# Patient Record
Sex: FEMALE | Race: WHITE | Employment: FULL TIME | URBAN - METROPOLITAN AREA
[De-identification: names, ages, dates, MRNs, and addresses within clinical notes are randomized per-mention and may not be internally consistent; named-entity substitution may affect disease eponyms.]

---

## 2020-02-17 ENCOUNTER — TRANSFERRED RECORDS (OUTPATIENT)
Dept: HEALTH INFORMATION MANAGEMENT | Facility: CLINIC | Age: 45
End: 2020-02-17

## 2020-03-18 ENCOUNTER — TELEPHONE (OUTPATIENT)
Dept: OPHTHALMOLOGY | Facility: CLINIC | Age: 45
End: 2020-03-18

## 2020-03-18 NOTE — TELEPHONE ENCOUNTER
Left message for patient.  Upcoming appointment with Dr. Santoyo has been cancelled due to concerns of COVID-19 and the safety of our patients.  Left main clinic number in order to reschedule for AT LEAST a month out.      Frances Hernandez on 3/18/2020 at 1:07 PM

## 2020-07-02 ENCOUNTER — TELEPHONE (OUTPATIENT)
Dept: OPHTHALMOLOGY | Facility: CLINIC | Age: 45
End: 2020-07-02

## 2020-07-02 NOTE — TELEPHONE ENCOUNTER
Tonic pupil referral-- referral from February  Spoke to pt at 1710  No new changes     Scheduled July 30th with Dr. Santoyo  Pt aware of date/time/location at [facility] and aware to call for any sudden changes    Keenan Sanders RN 5:14 PM 07/02/20    ---      Left message with direct number at [phone]  Keenan Sanders RN 4:51 PM 07/02/20          [facility]    Phone Message    May a detailed message be left on voicemail: yes     Reason for Call: Other: Pt called to schedule an Appt from her cancelled Appt on 3/23/20. COVID protocols state to send an encounter. Please call Pt back to schedule. Thank you     Action Taken: Message routed to:  [facility] EYE    Travel Screening: Not Applicable

## 2020-07-30 ENCOUNTER — OFFICE VISIT (OUTPATIENT)
Dept: OPHTHALMOLOGY | Facility: CLINIC | Age: 45
End: 2020-07-30
Attending: OPHTHALMOLOGY
Payer: COMMERCIAL

## 2020-07-30 DIAGNOSIS — H57.051 ADIE'S PUPIL, RIGHT: Primary | ICD-10-CM

## 2020-07-30 PROCEDURE — G0463 HOSPITAL OUTPT CLINIC VISIT: HCPCS | Mod: ZF | Performed by: TECHNICIAN/TECHNOLOGIST

## 2020-07-30 RX ORDER — RIZATRIPTAN BENZOATE 10 MG/1
TABLET ORAL
COMMUNITY
Start: 2020-02-17

## 2020-07-30 RX ORDER — AMITRIPTYLINE HYDROCHLORIDE 10 MG/1
30 TABLET ORAL
COMMUNITY
Start: 2020-04-20

## 2020-07-30 RX ORDER — FLUOXETINE 10 MG/1
30 CAPSULE ORAL
COMMUNITY
Start: 2020-04-20

## 2020-07-30 ASSESSMENT — CONF VISUAL FIELD
METHOD: COUNTING FINGERS
OS_NORMAL: 1
OD_NORMAL: 1

## 2020-07-30 ASSESSMENT — SLIT LAMP EXAM - LIDS
COMMENTS: NORMAL
COMMENTS: NORMAL

## 2020-07-30 ASSESSMENT — EXTERNAL EXAM - LEFT EYE: OS_EXAM: NORMAL

## 2020-07-30 ASSESSMENT — EXTERNAL EXAM - RIGHT EYE: OD_EXAM: NORMAL

## 2020-07-30 ASSESSMENT — CUP TO DISC RATIO
OS_RATIO: 0.2
OD_RATIO: 0.2

## 2020-07-30 ASSESSMENT — TONOMETRY
OS_IOP_MMHG: 17
OD_IOP_MMHG: 19
IOP_METHOD: ICARE

## 2020-07-30 ASSESSMENT — VISUAL ACUITY
OD_SC: 20/20
OS_SC: 20/20
METHOD: SNELLEN - LINEAR

## 2020-07-30 ASSESSMENT — MARGIN REFLEX DISTANCE
OS_MRD1: 4
OD_MRD1: 4

## 2020-07-30 NOTE — LETTER
2020         RE:  :  MRN: Socorro Moore  1975  [id number]     Dear SURI Arroyo,    Thank you for asking me to see your very pleasant patient, Socorro Moore, in neuro-ophthalmic consultation.  I would like to thank you for sending your records and I have summarized them in the history of present illness.  My assessment and plan are below.  For further details, please see my attached clinic note.      Assessment & Plan     Socorro Moore is a 44 year old female with the following diagnoses:   1. Adie's pupil, right         Patient was sent for consultation by Dr. Marcie Arroyo for Adies tonic pupil. She was diagnosed with Adies pupil 4 years ago after adding pilocarpine.  She denies any visual complaints.  Denies ptosis.  Hyperhidrosis at night only but most likely due to amitriptyline use.        POH: Addies tonic pupil  PMH: migraines, celiac disease    Visual acuity 20/20 both eyes.  Pupil size in ambient light right eye 6mm and left eye 4mm.  Pupil size in dark right eye 7mm and Left eye 5mm.  With convergence right eye 4mm and left eye 3mm.  Both pupils minimally reactive with light-near dissociation.  MRD1 4 millimeters both eyes.      It is my impression that patient has right Adie's tonic pupil.  I discussed that this entity is not well understood.  I told the patient that this occurs most commonly among young women.  It causes enlargement of the affected pupil and that pupil becomes sluggish to light, but still reacts to a near stimulus.  I told the patient that it does not affect visual function except accommodation in some individuals.  I found intact deep tendon reflexes, and therefore the patient does not have Adie syndrome.  There is a 1% risk per year that the other eye will become affected.    She does not have other symptoms of dysautonomia and I would not pursue any further testing.  I told her that she could consider bifocals for the accommodation issue in the right eye.  She may need to have  asymmetric bifocals because she cannot accommodate in the right eye but she can in the left eye.  She will discuss this with her doctor.          Again, thank you for allowing me to participate in the care of your patient.      Sincerely,    Jakob Santoyo MD  Professor  Ophthalmology Residency   Director of Neuro-Ophthalmology  [employer] [facility] [employer] Endowed Chair  Departments of Ophthalmology, Neurology, and Neurosurgery  [facility]   [address] [place]  [address]  T - [phone]  F - [phone]  [email or web address]      DX = tonic pupil

## 2020-07-30 NOTE — NURSING NOTE
Chief Complaint(s) and History of Present Illness(es)     New Patient     In right eye (Referral for tonic pupil).  Associated symptoms include headache.              Comments     Patient noticed anisocoria 4 years ago,. She was seen by a neuro-ophthalmologist and was told it was Adie's and would go away in a year but still present for 4 years. MRI/A 4 years ago not in [place].  Denies double vision. No ptosis. +headaches    LICO Jackson 7/30/2020 9:42 AM

## 2020-07-30 NOTE — PROGRESS NOTES
Assessment & Plan     Socorro Moore is a 44 year old female with the following diagnoses:   1. Adie's pupil, right         Patient was sent for consultation by Dr. Marcie Arroyo for Adies tonic pupil. She was diagnosed with Adies pupil 4 years ago after adding pilocarpine.  She denies any visual complaints.  Denies ptosis.  Hyperhidrosis at night only but most likely due to amitriptyline use.        POH: Addies tonic pupil  PMH: migraines, celiac disease    Visual acuity 20/20 both eyes.  Pupil size in ambient light right eye 6mm and left eye 4mm.  Pupil size in dark right eye 7mm and Left eye 5mm.  With convergence right eye 4mm and left eye 3mm.  Both pupils minimally reactive with light-near dissociation.  MRD1 4 millimeters both eyes.      It is my impression that patient has right Adie's tonic pupil.  I discussed that this entity is not well understood.  I told the patient that this occurs most commonly among young women.  It causes enlargement of the affected pupil and that pupil becomes sluggish to light, but still reacts to a near stimulus.  I told the patient that it does not affect visual function except accommodation in some individuals.  I found intact deep tendon reflexes, and therefore the patient does not have Adie syndrome.  There is a 1% risk per year that the other eye will become affected.    She does not have other symptoms of dysautonomia and I would not pursue any further testing.  I told her that she could consider bifocals for the accommodation issue in the right eye.  She may need to have asymmetric bifocals because she cannot accommodate in the right eye but she can in the left eye.  She will discuss this with her doctor.           Attending Physician Attestation:  Complete documentation of historical and exam elements from today's encounter can be found in the full encounter summary report (not reduplicated in this progress note).  I personally obtained the chief complaint(s) and history  of present illness.  I confirmed and edited as necessary the review of systems, past medical/surgical history, family history, social history, and examination findings as documented by others; and I examined the patient myself.  I personally reviewed the relevant tests, images, and reports as documented above.  I formulated and edited as necessary the assessment and plan and discussed the findings and management plan with the patient and family. - Jakob Zazueta MD  Neuro-ophthalmology fellow  [facility]

## 2020-07-30 NOTE — Clinical Note
7/30/2020       RE: Socorro Moore  [address]  [facility] [address]     Dear Colleague,    Thank you for referring your patient, Socorro Moore, to the EYE CLINIC at [facility]. Please see a copy of my visit note below.         Assessment & Plan     Socorro Moore is a 44 year old female with the following diagnoses:   1. Adie's pupil, right         Patient was sent for consultation by Dr. Marcie Arroyo for Adies tonic pupil. She was diagnosed with Adies pupil 4 years ago after adding pilocarpine.  She denies any visual complaints.  Denies ptosis.  Hyperhidrosis at night only but most likely due to amitriptyline use.        POH: Addies tonic pupil  PMH: migraines, celiac disease    Visual acuity 20/20 both eyes.  Pupil size in ambient light right eye 6mm and left eye 4mm.  Pupil size in dark right eye 7mm and Left eye 5mm.  With convergence right eye 4mm and left eye 3mm.  Both pupils minimally reactive with light-near dissociation.  MRD1 4 millimeters both eyes.      It is my impression that patient has right Adie's tonic pupil.  I discussed that this entity is not well understood.  I told the patient that this occurs most commonly among young women.  It causes enlargement of the affected pupil and that pupil becomes sluggish to light, but still reacts to a near stimulus.  I told the patient that it does not affect visual function except accommodation in some individuals.  I found intact deep tendon reflexes, and therefore the patient does not have Adie syndrome.  There is a 1% risk per year that the other eye will become affected.    She does not have other symptoms of dysautonomia and I would not pursue any further testing.  I told her that she could consider bifocals for the accommodation issue in the right eye.  She may need to have asymmetric bifocals because she cannot accommodate in the right eye but she can in the left eye.  She will discuss this with her doctor.            Attending Physician Attestation:  Complete documentation of historical and exam elements from today's encounter can be found in the full encounter summary report (not reduplicated in this progress note).  I personally obtained the chief complaint(s) and history of present illness.  I confirmed and edited as necessary the review of systems, past medical/surgical history, family history, social history, and examination findings as documented by others; and I examined the patient myself.  I personally reviewed the relevant tests, images, and reports as documented above.  I formulated and edited as necessary the assessment and plan and discussed the findings and management plan with the patient and family. - Jakob Zazueta MD  Neuro-ophthalmology fellow  [facility]      Again, thank you for allowing me to participate in the care of your patient.      Sincerely,    Jakob Santoyo MD

## 2021-03-15 ENCOUNTER — AMBULATORY - HEALTHEAST (OUTPATIENT)
Dept: NURSING | Facility: CLINIC | Age: 46
End: 2021-03-15

## 2021-04-05 ENCOUNTER — AMBULATORY - HEALTHEAST (OUTPATIENT)
Dept: NURSING | Facility: CLINIC | Age: 46
End: 2021-04-05

## 2021-07-24 ENCOUNTER — HEALTH MAINTENANCE LETTER (OUTPATIENT)
Age: 46
End: 2021-07-24

## 2021-09-18 ENCOUNTER — HEALTH MAINTENANCE LETTER (OUTPATIENT)
Age: 46
End: 2021-09-18

## 2022-08-20 ENCOUNTER — HEALTH MAINTENANCE LETTER (OUTPATIENT)
Age: 47
End: 2022-08-20

## 2022-11-20 ENCOUNTER — HEALTH MAINTENANCE LETTER (OUTPATIENT)
Age: 47
End: 2022-11-20

## 2023-09-10 ENCOUNTER — HEALTH MAINTENANCE LETTER (OUTPATIENT)
Age: 48
End: 2023-09-10